# Patient Record
Sex: FEMALE | Race: WHITE | NOT HISPANIC OR LATINO
[De-identification: names, ages, dates, MRNs, and addresses within clinical notes are randomized per-mention and may not be internally consistent; named-entity substitution may affect disease eponyms.]

---

## 2018-07-08 ENCOUNTER — RECORDS - HEALTHEAST (OUTPATIENT)
Dept: ADMINISTRATIVE | Facility: OTHER | Age: 73
End: 2018-07-08

## 2018-12-13 ENCOUNTER — AMBULATORY - HEALTHEAST (OUTPATIENT)
Dept: NEUROLOGY | Facility: CLINIC | Age: 73
End: 2018-12-13

## 2018-12-13 DIAGNOSIS — R44.3 HALLUCINATIONS: ICD-10-CM

## 2019-04-03 ENCOUNTER — HOSPITAL ENCOUNTER (OUTPATIENT)
Dept: NEUROLOGY | Facility: CLINIC | Age: 74
Setting detail: THERAPIES SERIES
Discharge: STILL ADMITTED OR ADMINISTRATIVE | End: 2019-04-03
Attending: PSYCHIATRY & NEUROLOGY

## 2019-05-28 ENCOUNTER — HOSPITAL ENCOUNTER (OUTPATIENT)
Dept: NEUROLOGY | Facility: CLINIC | Age: 74
Setting detail: THERAPIES SERIES
Discharge: STILL ADMITTED OR ADMINISTRATIVE | End: 2019-05-28

## 2019-05-28 ENCOUNTER — HOSPITAL ENCOUNTER (OUTPATIENT)
Dept: NEUROLOGY | Facility: CLINIC | Age: 74
Setting detail: THERAPIES SERIES
Discharge: STILL ADMITTED OR ADMINISTRATIVE | End: 2019-05-28
Attending: PSYCHIATRY & NEUROLOGY

## 2019-05-28 DIAGNOSIS — F02.80 FRONTOTEMPORAL DEMENTIA (H): ICD-10-CM

## 2019-05-28 DIAGNOSIS — G31.09 FRONTOTEMPORAL DEMENTIA (H): ICD-10-CM

## 2019-06-03 ENCOUNTER — RECORDS - HEALTHEAST (OUTPATIENT)
Dept: RADIOLOGY | Facility: CLINIC | Age: 74
End: 2019-06-03

## 2019-06-11 ENCOUNTER — HOSPITAL ENCOUNTER (OUTPATIENT)
Dept: NEUROLOGY | Facility: CLINIC | Age: 74
Setting detail: THERAPIES SERIES
Discharge: STILL ADMITTED OR ADMINISTRATIVE | End: 2019-06-11

## 2019-06-11 ENCOUNTER — HOSPITAL ENCOUNTER (OUTPATIENT)
Dept: NEUROLOGY | Facility: CLINIC | Age: 74
Setting detail: THERAPIES SERIES
Discharge: STILL ADMITTED OR ADMINISTRATIVE | End: 2019-06-11
Attending: PSYCHIATRY & NEUROLOGY

## 2019-06-11 DIAGNOSIS — G31.09 FRONTO-TEMPORAL DEMENTIA (H): ICD-10-CM

## 2019-06-11 DIAGNOSIS — F02.80 FRONTO-TEMPORAL DEMENTIA (H): ICD-10-CM

## 2021-05-29 NOTE — PROGRESS NOTES
"OUT PATIENT CLINICAL SOCIAL WORK: PSYCHOSOCIAL ASSESSMENT    Skylar Waters   1945 5/29/2019    Primary Language: English  Referral Source: Dr. Lei (PCP in at [facility])   Person(s) Present at Visit: Patient and spouse Jason   Goal(s) for Visit: Behavioral Managment and Mediciation adjustment.     Skylar Waters is a 74 y.o. female who was diagnosed with frontal temporal dementia about 1 year ago.  Patient did have an MRI and is also been started on some medication.  Spouse reports that patient has been on citalopram and Seroquel.  Over the last 4-5 months the patient has started hallucinations where she will see alligators or Sasquatch.  Spouse reports that she sees many different things and does not fixate on one particular object/thing.  Patient has also started developing a \"being problem\".  Patient will have short bursts where she will scream and can be hard to control. Spouse reports it is very difficult to go out in public as she gets excited and will touch and at times will even attempt to bite people.    PRIMARY DECISION MAKER FOR HEALTHCARE  Healthcare Agent  Copy of legal documents on chart? No - Requested copy from: Spouse    Name: Shakir Waters   Relationship: Spouse Home #: [phone]        PATIENT REPRESENTATIVE  Same as above    Primary Decision Maker for Healthcare provides verbal authorization for this clinic to have care coordination conversations with the above contacts as needed: Yes    HEALTHCARE DIRECTIVE/LEGAL ISSUES  Pt has healthcare directive: Yes  Spouse reports that [facility] and I suggested completing a healthcare directive which he reports he is the main healthcare agent.    If yes, copy of documents on chart? No - Requested copy from: Spouse    FINANCIAL INFORMATION  Primary Funding Source: [facility]  Secondary Funding Source: Medicare A and B     Additional Financial Information:  Financial POA: Yes- spouse   SSI / SSDI: No   Social " Security: Yes  : No  LTC Insurance: no    Medical Assistance (MA) Status:     N/A  County of Residence: 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    BELIEF SYSTEM: Did not assess    MEDICAL:  Please see  Dr. Frankel consultation from 5/28/2019 for complete medical history.  Community MD/Clinic: Dr. Lei -PCP from [facility].         CHEMICAL HEALTH:  Current Tobacco Use: None  Current Alcohol/Drug Use: None          PSYCHIATRIC / BEHAVIORAL:  Spouse describes his wife as always being accepting happy and family oriented.  He questions whether at this time the patient may be has some depression she spends a lot of time sleeping throughout the day.  As noted above patient has been having hallucinations where she sees many objects and will be impulsive physically intrusive and vocally disruptive in public.  Please see Dr. Frankel's consultation for more details related to behaviors.     HOME / LIVING ENVIRONMENT:  Patient lives: Alone and With SO/Family  Home Accessibility Concerns: None  When the patient and spouse retired they moved [place] [facility] [place]    COMMUNITY / SOCIAL SUPPORT:   Community services:     Additional Information/Concerns: Spouse reports he has been connected to the [employer] through [facility] which they provided him resources related to a healthcare directive as well as private duty caregivers that could come in to the home to help with supervision.    FAMILY SUPPORT:  Patient has been  to her spouse for 31 years together they have 2 children and 4 grandchildren.  John stated that his daughter and son [place] of the summer and will be helping care for  "the patient to allow him some time to do things on his own.  Patient enjoys being around her grandchildren however at times due to her behaviors they can frightened the grandchildren.    DAILY ROUTINE:  Sleep-patient spends a lot of the day sleeping.  Spouse does state that she will sleep through the night but then will wake up with impulsive screaming and then does return back to bed.  Nutrition-it has started to eat a lot more sweets than she used to in the past and will grab food off of his plate  if she wants with a her eating.  Activities-patient used to enjoy reading however this has become more difficult gradually throughout the last year    FUNCTIONAL STATUS:  Is patient driving? No     Is patient independent with the following activities? Bathing, Dressing, Grooming, Toileting, Eating and Mobility  Additional Information/Concerns: House helps manage all of patient's medications he now prepares the meals and manages his finances.    PRIOR COGNITIVE TESTING / IMAGING: Patient has had a full diet that was done here in the [facility] at the [facility] or [facility] spouse was unable to remember at time of appointment.    INTERVENTION(S):  Assessment    PATIENT/FAMILY OBSERVATIONS:  Patient was alert well-groomed and casually dressed.  When asked why the patient was here today she \"said for my screaming\" and then screamed in the exam room.  The patient was impulsive in the visit she would respond to some questions but spouse for most of the historical detail.  Patient at times would like she gets over excited and tried to grab at he    PLAN/FOLLOW-UP: Due to the patient living in [facility] and the spouse connected to the resources of the [facility] social work will follow up with patient and spouse as needed or requested.    Jacquelin GREER, LICSW          "

## 2021-05-29 NOTE — PROGRESS NOTES
[facility] Outpatient Consultation    Assessment / Impression:   1.  Behavioral variant frontotemporal dementia  2.  Behavioral disturbance likely in large part secondary to #1, rule out substance-induced mood disorder  3.  Degenerative arthritis, status post total right knee replacement  4.  Hypothyroidism  5.  Hypercholesterolemia      Plan:   1.  Decrease citalopram to 10 mg p.o. daily x7 days then discontinue  2.  Follow-up in 2 weeks.  Depending on progress consider downward titration and elimination of donepezil  3.  Review relevant medical records including neuroimaging  4.  Consider the appropriate use of environmental controls to manage more problematic behaviors as described below.    A long conversation held with the patient and  Mariano in attendance.  This patient with a greater than 4 to 5-year history of changes in personal conduct, interpersonal behavior and other aspects of cognition, was diagnosed with behavioral variant frontotemporal dementia after 1 or 2 evaluations.  Over the past 5 months the patient's behavioral condition has steadily deteriorated and may be associated with initiation of therapy with citalopram.  Also, the patient is receiving cholinesterase inhibitor therapy.  Given the time course of the patient's worsening behaviors associated with initiation of antidepressant therapy I do believe it would be worthwhile to taper this therapy initially followed by taper and discontinuation of cholinesterase inhibitor therapy.  Depending on the patient's progress further interventions may be required.    I have discussed my findings with the patient's  and I have answered all of his questions.  Total time for evaluation one hour with the majority of time spent counseling.  All questions answered.    Subjective:   HPI: Skylar Waters is a 74 y.o. female with above-noted diagnoses who is seen for behavioral evaluation.  The patient is by her  of more than 51 years,  Mariano, who reports that his wife was at her baseline state of health until approximately 2014 when she began showing subtle changes in personal conduct.  Difficult to describe,  noted that these changes were quite subtle and difficult to characterize but over time seem to indicate some loss of judgment and impulse control.  Gradually advancing eventual evaluation was undertaken here in [place] at the [facility] and possibly neurological Associates.  A diagnosis of behavioral variant frontotemporal dementia was rendered.    Changes in personal conduct and interpersonal behavior have worsened in 2019.  The patient is now noted to be frightening adults and children in public if she is noted to be impulsive, physically intrusive and vocally disruptive.  She is taken to putting inedible objects such as rocks in her mouth, pulling the ears of strangers and biting both strangers and family members.  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  It is because of these problem behaviors that she now presents for further evaluation.    On questioning, the  seems to believe that therapy with citalopram was initiated in the recent past for management of behaviors.  Behaviors have only worsened since initiation of this treatment.  He also reports of therapy with low-dose quetiapine was also initiated.  At one point during the interview he seemed to become confused as to the timing of initiation of these treatments.  He is uncertain as to how long therapy with donepezil has been administered.  Environmental adjustments have not been considered as an appropriate therapeutic option at this point in time as the  wishes to maintain his wife's activities including social activities/engagement.    The patient presents as a reasonably well groomed casually dressed middle-aged female who appears in no acute  "physical distress.  The patient reported that she had come to this appointment \"for my screeching.\"  She then promptly began screeching in a repetitive and impulsive manner.  Occasional laughter was noted.  Acknowledging a prior diagnosis of frontotemporal dementia and able to basically describe the nature of the illness, the patient reports without hesitancy that she has nothing wrong with her brain and that she is able at this point in time to manage her own affairs independently.  She clearly does not respond appropriately to environmental and social cues here in the exam room.  I noted no stereotypies or physically intrusive behavior with this examiner.    Past Medical History:   As above    Social History:   When [place], [place], the patient is a high school education as well as college obtaining a masters in child psychology from the [employer].   for 51 years, both she and her  reside in a home in [facility] where they have lived for 22 years.    Past Psychiatric History:   Negative    Family History:   Not obtained    Review of Systems:  As noted above.  The patient has demonstrated proclivity for sweet foods.  She is maintaining her personal hygiene \"so far.\"  Additional review of systems will need to be obtained on return to clinic as there was not enough time to further explore the patient's current symptomatology.    Objective:   As above    There were no vitals filed for this visit.  Physical Exam:    Deferred secondary to time constraints    Neurological Exam:     Deferred secondary to time constraints.  The patient demonstrates no clear evidence of focal neurological disability.    Cognitive Evaluation:     The patient had the appearance noted above.  She was noted to be generally well maintained and in no acute physical distress.  She was alert attentive but a bit impulsive with repetitive screeching sounds noted.  Affect was facile and somewhat fatuous.  Poor " insight was noted.  A tendency towards impulsive and perseverative behaviors noted.  No posturing.  Affect was as noted above mood difficult to assess although the patient seemed to possibly have a somewhat elevated mood.  Aditional assessment was not performed given time constraints.    Medications:  Scheduled Meds:  Continuous Infusions:  PRN Meds:.    Moses Frankel MD  5/28/2019

## 2021-05-29 NOTE — PROGRESS NOTES
OUT PATIENT-CLINICAL SOCIAL WORK PROGRESS NOTE    6/11/2019         Skylar Waters is a 74 y.o. female who came to the clinic today for a follow-up with Dr. Frankel.  At this clinic patient came with her spouse as well as her daughter and grandson.  Daughter Haritha wanted to meet with this writer to discuss future planning regarding her mother's care.  It is anticipated that with time patient will need to move into a more supportive environment, an assisted living or a skilled nursing facility.  Social work encouraged the daughter and spouse to meet with an elder law  so they can evaluate patient's finances and what they would be able to afford prior to moving into a  more structured environment.  Spouse has been connected to the [facility] and social work encouraged him to reach out again for a list of elder law attorneys that are in that area that know [place] laws and to ask the [facility] to provide suggestions on facilities that would able to meet the patient's needs.    Social work learned that the daughter lives in the [facility] but her family is staying with her parents for the summer to provide the spouse support that the patient needs. Social work informed the daughter of the FTD support group and encouraged her to attend when she is here in the [facility].  Social work also provided information for AFTD website as additional help and support for the patient.     It is noted by Dr. Frankel that there are concerns with spouses cognition and daughter reported that she is going to try to get the spouse to see his primary care provider for an evaluation.  Patient is extremely impulsive and yells out frequently.     ASSESSMENT: When patient is overly excited she will often try to bite either her spouse or her daughter who is trying to direct her in a different direction. Daughter appeared extremely overwhelmed with everything related to her mother situation.  Daughter does express support from her spouse as well as her  sibling.  This summer she and her brother are going to be with her parents for support and also to help create a plan that is more sustainable for her father caring for her mother.     PLAN: Social work provided the daughter is writer's contact info and encouraged her to reach out with any questions or concerns as it was difficult to carry on the in depth conversation with the patient present.     Type of Service: Office Visit    Services Provided: Assessment, Education, Counseling and Resources    Resources Provided: Support group, AFTD, information related to ADRC    Jacquelin GREER, LICSW

## 2021-05-29 NOTE — PROGRESS NOTES
Assessment / Impression   1.  Behavioral variant frontotemporal dementia  2.  Behavioral disturbance likely secondary to #1, rule out medication induced effects  3.  Other medical problems as noted      Plan:   1.  Discontinue donepezil  2.  Continue other therapies as previously prescribed  3.  Follow-up in 2 weeks  4.  Noncontrast MRI brain on return to clinic  5.   to meet family    A long conversation held with the patient daughter Haritha and  Mariano.  I would refer the reader to my prior intake assessment.  The  was incorrect and reporting that therapy with citalopram was initiated prior to worsening of the behaviors.  Rather, it likely was Aricept which is known to have the potential for worsening behaviors and behavioral variant frontotemporal dementia.  The patient is currently continuing to take citalopram.  The  mistakenly discontinued meloxicam after intake assessment.  Given this additional information I have advised that the patient continue on therapy with citalopram.  We will discontinue Aricept and observe for changes in behavior.  It is hoped that we will witness a reduction in the impulsivity and intrusive behaviors following discontinuation of cholinesterase inhibitor therapy.  In addition, because of concerns that the daughter is raising today, I will recheck an MRI of the brain and we will attempt to obtain old MRI for comparison.    I also discussed with the  and daughter my concerns regarding the .  It has been my observation that the  appears to be having inordinate difficulty in keeping information straight during the course of conversation.  He appears to be having some difficulty with short-term recall as well.  Apparently the daughter also shares these concerns.  We will continue to monitor but I have asked the patient to reach out to his primary physician in the meantime.    Total time for evaluation one hour with the majority  "time spent counseling.      Subjective:     HPI: Skylar Waters is a 74 y.o. female with above-noted past medical history who returns for follow-up.  The  who appears to be having great difficulty keeping information straight during the course of interview mistakenly discontinued the patient's meloxicam as opposed to citalopram as had been directed.  In addition, it is not the case that there is a temporal relationship between initiation of SSRI antidepressant therapy and worsening behaviors in this patient; rather, it is likely that donepezil therapy was initiated in late 2018 and that this is temporally related to worsening behaviors.    Through interview with the daughter, it would appear that there may have been some concern that the patient could be experiencing dementia with Lewy bodies as she apparently had been experiencing well-formed visual hallucinations in 2018.  Provision of cholinesterase inhibitor therapy, however, did not attenuate these experiences.  Today the patient admits to seeing alligators and gorillas but I am still not convinced that she may not be voicing delusional-type ideas as opposed to experiencing internally generated stimuli.  Again, cholinesterase inhibitor therapy has not attenuated these experiences according to family members but has been associated with worsening impulsivity, vocally disruptive and physically intrusive behaviors.    The patient appears unchanged from that noted previously.  She impulsively shouts out statements such as \"lee humbug\" at inappropriate times.  Frequent laughter is also noted.  Overall, however, the patient is reasonably directable.          Review of Systems:          As above        Objective:   There were no vitals filed for this visit.    No results found for this or any previous visit (from the past 24 hour(s)).    Physical Exam: Patient is neatly groomed casually dressed and seated for evaluation.  A mild degree of impulsivity is once " again noted.  On observation I note no tremors or fasciculations.  Eye exam is difficult secondary to significant impersistence.  No tonal abnormalities noted either axially or peripherally.  The patient station does demonstrate a slightly wide-based but is steady with no evidence of focal weakness or ataxia.  No evidence of gait apraxia noted.  Affect is pleasant but shallow mood euthymic as best as can be determined.  No clear evidence of visual or auditory hallucinations at this time.